# Patient Record
(demographics unavailable — no encounter records)

---

## 2025-02-28 NOTE — END OF VISIT
[] : Fellow [FreeTextEntry3] : EVENTS NOTED, STILL SMOKING, AS NEEDED INHALERS, COPD, CHEST CT SCREENING, SMOKING COUNSELING

## 2025-02-28 NOTE — HISTORY OF PRESENT ILLNESS
[TextBox_4] : COPD STILL ACTIVELY SMOKING INTERMITTENT SOB AS NEEDED INHALERS FEELS WELL LAST CHEST CT NOTED

## 2025-02-28 NOTE — DISCUSSION/SUMMARY
[FreeTextEntry1] : MODERATE COPD STABLE AS NEEDED INHALERS RENEW CHEST CT SCREENING SMOKING COUNSELING F/u visit 09/25

## 2025-02-28 NOTE — PHYSICAL EXAM
[No Acute Distress] : no acute distress [Normal Appearance] : normal appearance [Normal Rate/Rhythm] : normal rate/rhythm [Normal S1, S2] : normal s1, s2 [No Resp Distress] : no resp distress [Clear to Auscultation Bilaterally] : clear to auscultation bilaterally [No Abnormalities] : no abnormalities [Normal Color/ Pigmentation] : normal color/ pigmentation [No Focal Deficits] : no focal deficits [Oriented x3] : oriented x3 [Normal Affect] : normal affect [No Neck Mass] : no neck mass [No Murmurs] : no murmurs [Benign] : benign [Normal Gait] : normal gait [No Clubbing] : no clubbing [No Cyanosis] : no cyanosis [No Edema] : no edema [FROM] : FROM [TextBox_11] : PALE

## 2025-07-14 NOTE — PLAN
[FreeTextEntry1] : Plan: -Low Dose CT chest for lung cancer screening -Follow up with patient and her referring provider after her LDCT results have been reviewed by the multi-disciplinary clinical team -Encouraged smoking cessation   Engaged in shared decision making with Ms. PRASANTH CLIFFORD . Answered all questions. She verbalized understanding and agreement. She knows to call back with any questions or concerns

## 2025-07-14 NOTE — HISTORY OF PRESENT ILLNESS
[Current] : Current [>=20 Pack-Years] : Twenty pack years or greater smoking history: Yes [TextBox_13] : Referred by Dr. Salgado   Ms. PRASANTH CLIFFORD  is a 69 year old woman with a history of COPD, HLD.   She  was seen in the office by   for review of eligibility for, as well as, discussion of Low-Dose CT lung cancer screening program. Over the telephone today we reviewed and confirmed that the patient meets screening eligibility criteria:  Ms. CLIFFORD denies any signs or symptoms of lung cancer including new cough, change in cough, hemoptysis and unintentional weight loss.   Ms. CLIFFORD denies any personal history of lung cancer. No lung cancer in a 1st degree relative. Denies any history of lung disease. Denies any history of occupational exposures.   [PacksperDay] : 1 [N_Years] : 5 [[facility]] : [id number] PACT